# Patient Record
Sex: FEMALE | Race: WHITE | NOT HISPANIC OR LATINO | URBAN - METROPOLITAN AREA
[De-identification: names, ages, dates, MRNs, and addresses within clinical notes are randomized per-mention and may not be internally consistent; named-entity substitution may affect disease eponyms.]

---

## 2018-01-23 ENCOUNTER — ALLSCRIPTS OFFICE VISIT (OUTPATIENT)
Dept: OTHER | Facility: OTHER | Age: 73
End: 2018-01-23

## 2018-01-23 DIAGNOSIS — Z78.0 ASYMPTOMATIC MENOPAUSAL STATE: ICD-10-CM

## 2018-01-24 NOTE — PROGRESS NOTES
Assessment   1  Cystocele   2  Urinary bladder incontinence (788 30) (R32)    Plan   Cystocele    · 3 - Nishi GUERIN, Hanna  Obstetrics/Gynecology Co-Management  *  Status: Active     Requested for: [id number]   Ordered; For: Cystocele; Ordered By: Sandra Vasques Performed:  Due: [id number]; Last Updated By: Johnny Bustamante; 1/23/2018 4:09:37 PM  are Referring to a non-SL Preferred Provider : Patient refused suggestion for        recommended provider  Care Summary provided  : Yes    Discussion/Summary   Discussion Summary:    Options reviewed - observation ,physical therapy, pessary, surgery   not first line for anatomical changes but may benefit from anticholinergic for urgency candidate for estrogen due to h/o breast cancer  Counseling Documentation With Imm: The patient was counseled regarding diagnostic results,-- instructions for management,-- prognosis,-- risks and benefits of treatment options  total time of encounter was 35 minutes-- and-- 20 minutes was spent counseling  Goals and Barriers: The patient has the current Goals: Resolution of symptoms  The patent has the current Barriers: Possible need for surgery  Patient's Capacity to Self-Care: Patient is able to Self-Care  Patient Education: Educational resources provided: Causes of urinary incontinence, prolapse and treatment options  Medication SE Review and Pt Understands Tx: Possible side effects of new medications were reviewed with the patient/guardian today  The treatment plan was reviewed with the patient/guardian  The patient/guardian understands and agrees with the treatment plan    Self Referrals:    Self Referrals: No      Chief Complaint   Chief Complaint Free Text Note Form: Patient here for evaluation of urinary problems      History of Present Illness   HPI: 81year old with pelvic pressure and incomplete emptying    have been present for some time but the pressure symptom is getting worse           Incontinence, Urinary (Follow-Up): The patient reports no change in the condition  Interval symptoms:  new onset of urinary urgency-- and-- new onset of urinary frequency--       The patient presents with complaints of gradual onset of constant episodes of moderate new onset of incomplete emptying  Episodes started 1 year ago  She is currently experiencing new onset of incomplete emptying Symptoms are worsening (often will get urge to go and only voids a small amount, changes position then is able to void a little more)  The patient presents with complaints of gradual onset of constant episodes of vaginal pressure  Episodes started 1 year ago  She is currently experiencing vaginal pressure  Her symptoms are caused by no known event  Symptoms are not improved by hot packs, cold packs and drying the genital area  Symptoms are made worse by prolonged standing, sitting and direct contact, but not by frequent bathing, warm environment, tight clothing and urination  Symptoms are unchanged  Review of Systems   Focused-Female:      Constitutional: No fever, no chills, feels well, no tiredness, no recent weight gain or loss  ENT: no ear ache, no loss of hearing, no nosebleeds or nasal discharge, no sore throat or hoarseness  Cardiovascular: no complaints of slow or fast heart rate, no chest pain, no palpitations, no leg claudication or lower extremity edema  Respiratory: no complaints of shortness of breath, no wheezing, no dyspnea on exertion, no orthopnea or PND  Breasts: no complaints of breast pain, breast lump or nipple discharge  Gastrointestinal: no complaints of abdominal pain, no constipation, no nausea or diarrhea, no vomiting, no bloody stools  Genitourinary: no complaints of dysuria, no incontinence, no pelvic pain, no dysmenorrhea, no vaginal discharge or abnormal vaginal bleeding        Musculoskeletal: no complaints of arthralgia, no myalgia, no joint swelling or stiffness, no limb pain or swelling  Integumentary: no complaints of skin rash or lesion, no itching or dry skin, no skin wounds  Neurological: no complaints of headache, no confusion, no numbness or tingling, no dizziness or fainting  Active Problems   1  Anesthesia of skin (782 0) (R20 0)    Vitals   Vital Signs    Recorded: [id number] 16:51IV   Systolic 447   Diastolic 72   Height 5 ft 7 in   Weight 175 lb    BMI Calculated 27 41   BSA Calculated 1 91   LMP WYATT BSO     Physical Exam        Genitourinary      External genitalia: Normal and no lesions appreciated  Examination of the external genitalia showed vulvar atrophy, but-- no vulvitis  The labia majora were normal       Vagina: Abnormal   Vagina: atrophy,-- dry mucosa-- and-- cystocele, but-- no tenderness  Urethra: Normal  -- (caruncle)      Bladder: Normal, soft, non-tender and no prolapse or masses appreciated  Cervix: Surgically absent  Uterus: Surgically absent  Adnexa/parametria: Surgically absent         Signatures    Electronically signed by : Mike Rudd MD; Jan 23 2018  4:56PM EST                       (Author)

## 2018-03-22 ENCOUNTER — TELEPHONE (OUTPATIENT)
Dept: OBGYN CLINIC | Facility: CLINIC | Age: 73
End: 2018-03-22

## 2018-03-22 NOTE — TELEPHONE ENCOUNTER
Physician office called today, left voicemail message  Office staff states they need referral for Pt faxed to [phone]  Phone number for physician office is [phone]  Pt referred for bladder prolapse

## 2022-09-14 ENCOUNTER — OFFICE VISIT (OUTPATIENT)
Dept: URGENT CARE | Facility: CLINIC | Age: 77
End: 2022-09-14
Payer: MEDICARE

## 2022-09-14 VITALS
SYSTOLIC BLOOD PRESSURE: 132 MMHG | RESPIRATION RATE: 18 BRPM | DIASTOLIC BLOOD PRESSURE: 77 MMHG | OXYGEN SATURATION: 97 % | HEART RATE: 77 BPM

## 2022-09-14 DIAGNOSIS — H60.501 ACUTE OTITIS EXTERNA OF RIGHT EAR, UNSPECIFIED TYPE: Primary | ICD-10-CM

## 2022-09-14 PROCEDURE — 99203 OFFICE O/P NEW LOW 30 MIN: CPT | Performed by: PHYSICIAN ASSISTANT

## 2022-09-14 PROCEDURE — G0463 HOSPITAL OUTPT CLINIC VISIT: HCPCS | Performed by: PHYSICIAN ASSISTANT

## 2022-09-14 RX ORDER — ATORVASTATIN CALCIUM 20 MG/1
1 TABLET, FILM COATED ORAL DAILY
COMMUNITY
Start: 2022-04-20

## 2022-09-14 RX ORDER — CIPROFLOXACIN AND DEXAMETHASONE 3; 1 MG/ML; MG/ML
4 SUSPENSION/ DROPS AURICULAR (OTIC) 2 TIMES DAILY
Qty: 5 ML | Refills: 0 | Status: SHIPPED | OUTPATIENT
Start: 2022-09-14 | End: 2022-09-21

## 2022-09-14 RX ORDER — TRIAMTERENE AND HYDROCHLOROTHIAZIDE 75; 50 MG/1; MG/1
1 TABLET ORAL DAILY
COMMUNITY
Start: 2018-01-17

## 2022-09-14 RX ORDER — MELOXICAM 15 MG/1
15 TABLET ORAL DAILY
COMMUNITY
Start: 2022-08-30

## 2022-09-14 RX ORDER — LEVOTHYROXINE SODIUM 0.15 MG/1
TABLET ORAL
COMMUNITY
Start: 2022-04-20

## 2022-09-14 NOTE — PROGRESS NOTES
[facility]        NAME: Holly Shook is a 78 y o  female  : 1945    MRN: [id number]  DATE: 2022  TIME: 2:36 PM    Assessment and Plan   Acute otitis externa of right ear, unspecified type [H60 501]  1  Acute otitis externa of right ear, unspecified type  ciprofloxacin-dexamethasone (CIPRODEX) otic suspension         Patient Instructions       Follow up with PCP in 3-5 days  Proceed to  ER if symptoms worsen  Chief Complaint     Chief Complaint   Patient presents with    Ear Fullness     Patient states 2 weeks ago she went to PCP for loss of hearing in right ear  PCP said she had impacted wax  Patient bought OTC ear drops and irrigation but pt has been in severe pain using that medicine that was recommended by PCP  Patient still can't hear well and unable to get any wax out  Pain is worsening  History of Present Illness       Patient is a 77 yo female who presents with impacted ear wax in her right ear with associated difficulty hearing and pain x 2 weeks  Went to her PCP who gave her Debrox and told her to come to [facility] to have ear flushed  Symptoms have not improved despite using drops  Does states a clump of wax came out when using the drops  Denies fevers, pain or swelling behind the ear, headaches, dizziness             Review of Systems   Review of Systems   Constitutional: Negative for chills and fever  HENT: Positive for ear pain and hearing loss  Negative for ear discharge and facial swelling  Skin: Negative for color change  Neurological: Negative for dizziness and headaches           Current Medications       Current Outpatient Medications:     atorvastatin (LIPITOR) 20 mg tablet, Take 1 tablet by mouth daily, Disp: , Rfl:     ciprofloxacin-dexamethasone (CIPRODEX) otic suspension, Administer 4 drops to the right ear 2 (two) times a day for 7 days, Disp: 5 mL, Rfl: 0    levothyroxine 150 mcg tablet, TAKE 1 TABLET DAILY AT     LEAST 30 MINUTES PRIOR TO  BREAKFAST OR OTHER         MEDICATIONS, Disp: , Rfl:     meloxicam (MOBIC) 15 mg tablet, Take 15 mg by mouth daily, Disp: , Rfl:     triamterene-hydrochlorothiazide (MAXZIDE) 75-50 MG per tablet, Take 1 tablet by mouth daily, Disp: , Rfl:     Current Allergies     Allergies as of 09/14/2022 - Reviewed 09/14/2022   Allergen Reaction Noted    Pollen extract Other (See Comments) 07/09/2020            The following portions of the patient's history were reviewed and updated as appropriate: allergies, current medications, past family history, past medical history, past social history, past surgical history and problem list      Past Medical History:   Diagnosis Date    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       History reviewed  No pertinent family history  Medications have been verified  Objective   /77   Pulse 77   Resp 18   SpO2 97%        Physical Exam     Physical Exam  Constitutional:       General: She is not in acute distress  Appearance: She is not toxic-appearing  HENT:      Left Ear: Tympanic membrane, ear canal and external ear normal       Ears:      Comments: No cerumen of right ear  Right EAC with moderate edema and erythema   Cardiovascular:      Rate and Rhythm: Normal rate  Pulmonary:      Effort: Pulmonary effort is normal    Skin:     General: Skin is warm and dry  Neurological:      Mental Status: She is alert     Psychiatric:         Mood and Affect: Mood normal          Behavior: Behavior normal